# Patient Record
Sex: FEMALE | Employment: UNEMPLOYED | URBAN - METROPOLITAN AREA
[De-identification: names, ages, dates, MRNs, and addresses within clinical notes are randomized per-mention and may not be internally consistent; named-entity substitution may affect disease eponyms.]

---
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
## 2022-02-01 RX ORDER — NORGESTREL AND ETHINYL ESTRADIOL 0.3-0.03MG
KIT ORAL
Qty: 28 TABLET | Refills: 17 | OUTPATIENT
Start: 2022-02-01

## 2022-02-04 ENCOUNTER — OFFICE VISIT (OUTPATIENT)
Dept: PEDIATRIC ENDOCRINOLOGY | Age: 22
End: 2022-02-04
Payer: MEDICAID

## 2022-02-04 VITALS
HEART RATE: 90 BPM | WEIGHT: 159.13 LBS | DIASTOLIC BLOOD PRESSURE: 79 MMHG | BODY MASS INDEX: 27.17 KG/M2 | OXYGEN SATURATION: 96 % | RESPIRATION RATE: 18 BRPM | SYSTOLIC BLOOD PRESSURE: 129 MMHG | TEMPERATURE: 98 F

## 2022-02-04 DIAGNOSIS — E88.81 INSULIN RESISTANCE: Primary | ICD-10-CM

## 2022-02-04 PROCEDURE — 99214 OFFICE O/P EST MOD 30 MIN: CPT | Performed by: PEDIATRICS

## 2022-02-04 RX ORDER — NORGESTREL AND ETHINYL ESTRADIOL 0.3-0.03MG
1 KIT ORAL DAILY
Qty: 28 TABLET | Refills: 6 | Status: SHIPPED | OUTPATIENT
Start: 2022-02-04 | End: 2022-08-22

## 2022-02-04 RX ORDER — METFORMIN HYDROCHLORIDE 500 MG/1
500 TABLET, EXTENDED RELEASE ORAL
Qty: 30 TABLET | Refills: 6 | Status: SHIPPED | OUTPATIENT
Start: 2022-02-04

## 2022-02-04 NOTE — PROGRESS NOTES
[address] [facility]  [address], [address]  [phone]      Cc: PCOS        Insulin resistance        Acanthosis  [facility]: Tia Dougherty is a 23 years and 2 month old female who presents for follow up evaluation of PCOS. She is on metformin 500 mg SR 2 tabs twice a day, has not been taking the medications for few months as she had some stomach upset. She takes OCP with 30 mcg of estrogen once a day. Compliance with medications: good. Menstrual cycles: regular, facial hair: no, acne: better. Appetite: good, has 3 meals  2 snacks per day. Family history:  diabetes  Social history: school going: well     Review of Systems  Constitutional: good energy, GI:  normal bowel movements, no abdominal pain  Allergy: no skin rash or angioedema, Neurological: no headache, no focal weakness. Muscular: cramps:no, weakness/dizziness: no Skin: acne:better. She is going to college at [employer]. Past Medical History:       Past Medical History:   Diagnosis Date    History of repair of ACL 06/18/2019     Past Surgical History:   Procedure Laterality Date    HX ACL RECONSTRUCTION Left 06/18/2019     History reviewed. No pertinent family history.   Current Outpatient Medications   Medication Sig Dispense Refill    [place], 28, 0.3-30 mg-mcg tab TAKE 1 TABLET BY MOUTH EVERY DAY 28 Tab 17     No Known Allergies  Social History     Socioeconomic History    Marital status: SINGLE     Spouse name: Not on file    Number of children: Not on file    Years of education: Not on file    Highest education level: Not on file   Occupational History    Not on file   Tobacco Use    Smoking status: Never Smoker    Smokeless tobacco: Never Used   Substance and Sexual Activity    Alcohol use: No    Drug use: No    Sexual activity: Never   Other Topics Concern    Not on file   Social History Narrative    Not on file     Social Determinants of Health     Financial Resource Strain:     Difficulty of Paying Living Expenses: Not on file   Food Insecurity:     Worried About Running Out of Food in the Last Year: Not on file    Ran Out of Food in the Last Year: Not on file   Transportation Needs:     Lack of Transportation (Medical): Not on file    Lack of Transportation (Non-Medical): Not on file   Physical Activity:     Days of Exercise per Week: Not on file    Minutes of Exercise per Session: Not on file   Stress:     Feeling of Stress : Not on file   Social Connections:     Frequency of Communication with Friends and Family: Not on file    Frequency of Social Gatherings with Friends and Family: Not on file    Attends Quaker Services: Not on file    Active Member of [address] [place] [address] [employer] or Organizations: Not on file    Attends Club or Organization Meetings: Not on file    Marital Status: Not on file   Intimate Partner Violence:     Fear of Current or Ex-Partner: Not on file    Emotionally Abused: Not on file    Physically Abused: Not on file    Sexually Abused: Not on file   Housing Stability:     Unable to Pay for Housing in the Last Year: Not on file    Number of [place] in the Last Year: Not on file    Unstable Housing in the Last Year: Not on file     Objective:     Visit Vitals  /79 (BP 1 Location: Left arm, BP Patient Position: Sitting)   Pulse 90   Temp 98 °F (36.7 °C)   Resp 18   Wt 159 lb 2 oz (72.2 kg)   SpO2 96%   BMI 27.17 kg/m²     Wt Readings from Last 3 Encounters:   02/04/22 159 lb 2 oz (72.2 kg)   08/12/20 159 lb 6.4 oz (72.3 kg) (89 %, Z= 1.21)*   03/12/20 165 lb 6.4 oz (75 kg) (92 %, Z= 1.39)*     * Growth percentiles are based on CDC (Girls, 2-20 Years) data. Ht Readings from Last 3 Encounters:   08/12/20 5' 4.17\" (1.63 m) (49 %, Z= -0.03)*   03/12/20 5' 4.13\" (1.629 m) (48 %, Z= -0.04)*   12/12/19 5' 4.53\" (1.639 m) (55 %, Z= 0.12)*     * Growth percentiles are based on CDC (Girls, 2-20 Years) data. Body mass index is 27.17 kg/m².   Facility age limit for growth percentiles is 20 years. Facility age limit for growth percentiles is 20 years. Facility age limit for growth percentiles is 20 years. Physical Exam:   General appearance - hydration: normal, no respiratory distress EYE- conjuctiva: normal,  Mouth -palate: normal, dentition: normal  Neck - acanthosis: yes, significant, thyromegaly: no, pulse equal and normal rhythm  Abdomen - nondistended Skin- cafe au lait: no, acne: no, resolved, facial hair: no. Neuro -DTR: normal, muscle tone:normal    Assessment:Plan   PCOS  Weight: increased  Acanthosis:yes  Concern for lean PCOS reviewed  Fasting insulin is normal  Features of androgen excess: Acne: resolved,  Hirsutism: resolved  Time spent counseling patient 20 minutes on the following:  Medications: OCP and side effects and risk for clots was reviewed  Effect of weight management: reviewed, restart metformin 500 mg SR 1 tab po once a day. Labs: CMP, lipid profile and A1C  Total time with patient 30 minutes. Follow up in 6 months.

## 2022-02-05 LAB
ALBUMIN SERPL-MCNC: 4.7 G/DL (ref 3.9–5)
ALBUMIN/GLOB SERPL: 1.7 {RATIO} (ref 1.2–2.2)
ALP SERPL-CCNC: 57 IU/L (ref 42–106)
ALT SERPL-CCNC: 20 IU/L (ref 0–32)
AST SERPL-CCNC: 23 IU/L (ref 0–40)
BILIRUB SERPL-MCNC: 0.4 MG/DL (ref 0–1.2)
BUN SERPL-MCNC: 6 MG/DL (ref 6–20)
BUN/CREAT SERPL: 8 (ref 9–23)
CALCIUM SERPL-MCNC: 9.8 MG/DL (ref 8.7–10.2)
CHLORIDE SERPL-SCNC: 103 MMOL/L (ref 96–106)
CHOLEST SERPL-MCNC: 171 MG/DL (ref 100–199)
CO2 SERPL-SCNC: 21 MMOL/L (ref 20–29)
CREAT SERPL-MCNC: 0.77 MG/DL (ref 0.57–1)
EST. AVERAGE GLUCOSE BLD GHB EST-MCNC: 111 MG/DL
GLOBULIN SER CALC-MCNC: 2.7 G/DL (ref 1.5–4.5)
GLUCOSE SERPL-MCNC: 91 MG/DL (ref 65–99)
HBA1C MFR BLD: 5.5 % (ref 4.8–5.6)
HDLC SERPL-MCNC: 40 MG/DL
IMP & REVIEW OF LAB RESULTS: NORMAL
LDLC SERPL CALC-MCNC: 107 MG/DL (ref 0–99)
POTASSIUM SERPL-SCNC: 4.1 MMOL/L (ref 3.5–5.2)
PROT SERPL-MCNC: 7.4 G/DL (ref 6–8.5)
SODIUM SERPL-SCNC: 141 MMOL/L (ref 134–144)
TRIGL SERPL-MCNC: 135 MG/DL (ref 0–149)
VLDLC SERPL CALC-MCNC: 24 MG/DL (ref 5–40)

## 2022-08-04 ENCOUNTER — OFFICE VISIT (OUTPATIENT)
Dept: PEDIATRIC ENDOCRINOLOGY | Age: 22
End: 2022-08-04
Payer: MEDICAID

## 2022-08-04 VITALS
OXYGEN SATURATION: 97 % | HEIGHT: 65 IN | DIASTOLIC BLOOD PRESSURE: 62 MMHG | HEART RATE: 58 BPM | BODY MASS INDEX: 24.86 KG/M2 | RESPIRATION RATE: 16 BRPM | WEIGHT: 149.2 LBS | SYSTOLIC BLOOD PRESSURE: 118 MMHG | TEMPERATURE: 98.2 F

## 2022-08-04 DIAGNOSIS — E88.81 INSULIN RESISTANCE: Primary | ICD-10-CM

## 2022-08-04 PROCEDURE — 99214 OFFICE O/P EST MOD 30 MIN: CPT | Performed by: PEDIATRICS

## 2022-08-04 NOTE — PROGRESS NOTES
[address] [place], [place] [address]  [phone]      Cc: PCOS        Insulin resistance        Acanthosis  [facility]: Mitchell Perdue is a 23 years and 10 months old female who presents for follow up evaluation of PCOS. She is on metformin 500 mg SR 2 tabs twice a day, has not been taking the medications regularly. She takes OCP with 30 mcg of estrogen once a day. Compliance with medications: good. Menstrual cycles: regular, facial hair: no, acne: better. Appetite: good, has 3 meals  2 snacks per day. Family history:  diabetes  Social history: school going: well     Review of Systems  Constitutional: good energy, GI:  normal bowel movements, no abdominal pain  Allergy: no skin rash or angioedema, Neurological: no headache, no focal weakness. Muscular: cramps:no, weakness/dizziness: no Skin: acne:better. She is going to college at [employer]. Past Medical History:       Past Medical History:   Diagnosis Date    History of repair of ACL 06/18/2019     Past Surgical History:   Procedure Laterality Date    HX ACL RECONSTRUCTION Left 06/18/2019     History reviewed. No pertinent family history. Current Outpatient Medications   Medication Sig Dispense Refill    norgestrel-ethinyl estradioL ([place], 28,) 0.3-30 mg-mcg tab Take 1 Tablet by mouth daily. 28 Tablet 6    metFORMIN ER (GLUCOPHAGE XR) 500 mg tablet Take 1 Tablet by mouth daily (with dinner).  Indications: polycystic ovarian syndrome, a disease with cysts in the ovaries 30 Tablet 6     No Known Allergies  Social History     Socioeconomic History    Marital status: SINGLE     Spouse name: Not on file    Number of children: Not on file    Years of education: Not on file    Highest education level: Not on file   Occupational History    Not on file   Tobacco Use    Smoking status: Never    Smokeless tobacco: Never   Substance and Sexual Activity    Alcohol use: No    Drug use: No    Sexual activity: Never   Other Topics Concern    Not on file   Social History Narrative    Not on file     Social Determinants of Health     Financial Resource Strain: Not on file   Food Insecurity: Not on file   Transportation Needs: Not on file   Physical Activity: Not on file   Stress: Not on file   Social Connections: Not on file   Intimate Partner Violence: Not on file   Housing Stability: Not on file     Objective:     Visit Vitals  /62   Pulse (!) 58   Temp 98.2 °F (36.8 °C) (Oral)   Resp 16   Ht 5' 4.57\" (1.64 m)   Wt 149 lb 3.2 oz (67.7 kg)   LMP 07/25/2022   SpO2 97%   BMI 25.16 kg/m²     Wt Readings from Last 3 Encounters:   08/04/22 149 lb 3.2 oz (67.7 kg)   02/04/22 159 lb 2 oz (72.2 kg)   08/12/20 159 lb 6.4 oz (72.3 kg) (89 %, Z= 1.21)*     * Growth percentiles are based on [facility] (Girls, 2-20 Years) data. Ht Readings from Last 3 Encounters:   08/04/22 5' 4.57\" (1.64 m)   08/12/20 5' 4.17\" (1.63 m) (49 %, Z= -0.03)*   03/12/20 5' 4.13\" (1.629 m) (48 %, Z= -0.04)*     * Growth percentiles are based on [facility] (Girls, 2-20 Years) data. Body mass index is 25.16 kg/m². Facility age limit for growth percentiles is 20 years. Facility age limit for growth percentiles is 20 years. Facility age limit for growth percentiles is 20 years. Physical Exam:   General appearance - hydration: normal, no respiratory distress EYE- conjuctiva: normal,  Mouth -palate: normal, dentition: normal  Neck - acanthosis: yes, significant, thyromegaly: no, pulse equal and normal rhythm  Abdomen - nondistended Skin- cafe au lait: no, acne: no, resolved, facial hair: no. Neuro -DTR: normal, muscle tone:normal    Assessment:Plan   PCOS  Weight: done well with weight management and doing more physical activity.   Acanthosis:yes  Concern for lean PCOS reviewed  Fasting insulin is normal  Features of androgen excess: Acne: resolved,  Hirsutism: resolved  Time spent counseling patient 20 minutes on the following:  Medications: OCP and side effects and risk for clots was reviewed  Effect of weight management: reviewed, will stop metformin. Labs: CMP, lipid profile and A1C at next visit in 4 months  Total time with patient 30 minutes. Follow up in 4 months.

## 2022-08-22 RX ORDER — NORGESTREL AND ETHINYL ESTRADIOL 0.3-0.03MG
KIT ORAL
Qty: 28 TABLET | Refills: 6 | Status: SHIPPED | OUTPATIENT
Start: 2022-08-22

## 2022-12-12 ENCOUNTER — OFFICE VISIT (OUTPATIENT)
Dept: PEDIATRIC ENDOCRINOLOGY | Age: 22
End: 2022-12-12
Payer: MEDICAID

## 2022-12-12 VITALS
TEMPERATURE: 97.6 F | BODY MASS INDEX: 23.85 KG/M2 | WEIGHT: 148.4 LBS | RESPIRATION RATE: 18 BRPM | SYSTOLIC BLOOD PRESSURE: 120 MMHG | DIASTOLIC BLOOD PRESSURE: 82 MMHG | HEART RATE: 65 BPM | OXYGEN SATURATION: 98 % | HEIGHT: 66 IN

## 2022-12-12 DIAGNOSIS — E28.2 PCOS (POLYCYSTIC OVARIAN SYNDROME): Primary | ICD-10-CM

## 2022-12-12 PROCEDURE — 99214 OFFICE O/P EST MOD 30 MIN: CPT | Performed by: PEDIATRICS

## 2022-12-12 NOTE — LETTER
12/12/2022 12:11 PM    Patient:  Fiorella Barahona   YOB: 2000  Date of Visit: 12/12/2022      Dear MD [phone]  Via Fax: [phone]:      Thank you for referring Ms. Fiorella Barahona to me for evaluation/treatment. Below are the relevant portions of my assessment and plan of care. Chief Complaint   Patient presents with    PCOS     4 month f/u       1. Have you been to the ER, urgent care clinic since your last visit? Hospitalized since your last visit? No    2. Have you seen or consulted any other health care providers outside of the [facility] [place] [address] since your last visit? Include any pap smears or colon screening. No    Visit Vitals  /82 (BP 1 Location: Left upper arm, BP Patient Position: Sitting)   Pulse 65   Temp 97.6 °F (36.4 °C) (Oral)   Resp 18   Ht 5' 5.51\" (1.664 m)   Wt 148 lb 6.4 oz (67.3 kg)   SpO2 98%   BMI 24.31 kg/m²         [address] [address] [phone]      Cc: PCOS        Insulin resistance        Acanthosis        Stress-     [facility]: Fiorella Barahona is a 23 years and 8 months old female who presents for follow up evaluation of PCOS. She was on metformin 500 mg SR 2 tabs twice a day, has been stopped. She takes OCP with 30 mcg of estrogen once a day. Compliance with medications: good. Menstrual cycles: not regular for last 3-4 months, less bleeding and last 3-5 days, has more stress related to home and related to work, facial hair: no, acne: better. Appetite: good, has 3 meals  2 snacks per day. Family history:  diabetes  Social history: school going: well     Review of Systems  Constitutional: good energy, GI:  normal bowel movements, no abdominal pain  Allergy: no skin rash or angioedema, Neurological: no headache, no focal weakness. Muscular: cramps:no, weakness/dizziness: no Skin: acne:better.  She is going to college at [employer]. Past Medical History:       Past Medical History:   Diagnosis Date    History of repair of ACL 06/18/2019     Past Surgical History:   Procedure Laterality Date    HX ACL RECONSTRUCTION Left 06/18/2019     History reviewed. No pertinent family history. Current Outpatient Medications   Medication Sig Dispense Refill    Low-Ogestrel, 28, 0.3-30 mg-mcg tab TAKE 1 TABLET BY MOUTH EVERY DAY 28 Tablet 6    metFORMIN ER (GLUCOPHAGE XR) 500 mg tablet Take 1 Tablet by mouth daily (with dinner).  Indications: polycystic ovarian syndrome, a disease with cysts in the ovaries (Patient not taking: Reported on 12/12/2022) 30 Tablet 6     No Known Allergies  Social History     Socioeconomic History    Marital status: SINGLE     Spouse name: Not on file    Number of children: Not on file    Years of education: Not on file    Highest education level: Not on file   Occupational History    Not on file   Tobacco Use    Smoking status: Never    Smokeless tobacco: Never   Substance and Sexual Activity    Alcohol use: No    Drug use: No    Sexual activity: Never   Other Topics Concern    Not on file   Social History Narrative    Not on file     Social Determinants of Health     Financial Resource Strain: Not on file   Food Insecurity: Not on file   Transportation Needs: Not on file   Physical Activity: Not on file   Stress: Not on file   Social Connections: Not on file   Intimate Partner Violence: Not on file   Housing Stability: Not on file     Objective:     Visit Vitals  /82 (BP 1 Location: Left upper arm, BP Patient Position: Sitting)   Pulse 65   Temp 97.6 °F (36.4 °C) (Oral)   Resp 18   Ht 5' 5.51\" (1.664 m)   Wt 148 lb 6.4 oz (67.3 kg)   SpO2 98%   BMI 24.31 kg/m²     Wt Readings from Last 3 Encounters:   12/12/22 148 lb 6.4 oz (67.3 kg)   08/04/22 149 lb 3.2 oz (67.7 kg)   02/04/22 159 lb 2 oz (72.2 kg)     Ht Readings from Last 3 Encounters:   12/12/22 5' 5.51\" (1.664 m)   08/04/22 5' 4.57\" (1.64 m)   08/12/20 5' 4.17\" (1.63 m) (49 %, Z= -0.03)*     * Growth percentiles are based on [facility] (Girls, 2-20 Years) data. Body mass index is 24.31 kg/m². Facility age limit for growth percentiles is 20 years. Facility age limit for growth percentiles is 20 years. Facility age limit for growth percentiles is 20 years. Physical Exam:   General appearance - hydration: normal, no respiratory distress EYE- conjuctiva: normal,  Mouth -palate: normal, dentition: normal  Neck - acanthosis: yes, significant, thyromegaly: no, pulse equal and normal rhythm  Abdomen - nondistended Skin- cafe au lait: no, acne: no, resolved, facial hair: no. Neuro -DTR: normal, muscle tone:normal    Assessment:Plan   PCOS  Weight: done well with weight management and doing more physical activity. Acanthosis:yes  Concern for lean PCOS reviewed  Fasting insulin is normal  Cycles are regular on the OCP, likely related to stress and met our counselor today. Features of androgen excess: Acne: resolved,  Hirsutism: resolved  Time spent counseling patient 20 minutes on the following:  Medications: OCP and side effects and risk for clots was reviewed  Effect of weight management: reviewed, metformin was stopped. Stress-talked to our counselor today and got information. Follow up in 5 months. ABDI met with Sánchez Jim to conduct mood/emotional check in and provide therapeutic support. Sánchez Jim presented as stressed and overwhelmed due to struggles with work/life/school balance. She is a full-time student and also works. She is not currently in therapy, but was in the past through her college. ABDI discussed the benefits of therapy to better mental health and well- being. Tia denied concerns with safety. She reported being open to therapy, but unsure of what it would look like for her at this point. She was provided with a list of healthy/positive to assist with mood management.   Sánchez Jim was encouraged to take some time to think about restarting counseling with new provider or pervious provider. SW discussed plan for Alfonso Polanco to follow up at her earliest convenience. Will continue to follow in-clinic as needed and permitted. If you have questions, please do not hesitate to call me. I look forward to following Ms. Mumtaz Regalado along with you.         Sincerely,      Yolis Hutchinson MD

## 2022-12-12 NOTE — PROGRESS NOTES
Chief Complaint   Patient presents with    PCOS     4 month f/u       1. Have you been to the ER, urgent care clinic since your last visit? Hospitalized since your last visit? No    2. Have you seen or consulted any other health care providers outside of the [address] [place] [address] since your last visit? Include any pap smears or colon screening.  No    Visit Vitals  /82 (BP 1 Location: Left upper arm, BP Patient Position: Sitting)   Pulse 65   Temp 97.6 °F (36.4 °C) (Oral)   Resp 18   Ht 5' 5.51\" (1.664 m)   Wt 148 lb 6.4 oz (67.3 kg)   SpO2 98%   BMI 24.31 kg/m²

## 2022-12-12 NOTE — PROGRESS NOTES
[facility]  [address] [place], [place] [address]  [phone]      Cc: PCOS        Insulin resistance        Acanthosis        Stress-     \[facility] [place] [facility]\"": Ryland Yung is a 23 years and 8 months old female who presents for follow up evaluation of PCOS. She was on metformin 500 mg SR 2 tabs twice a day, has been stopped. She takes OCP with 30 mcg of estrogen once a day. Compliance with medications: good. Menstrual cycles: not regular for last 3-4 months, less bleeding and last 3-5 days, has more stress related to home and related to work, facial hair: no, acne: better. Appetite: good, has 3 meals  2 snacks per day. Family history:  diabetes  Social history: school going: well     Review of Systems  Constitutional: good energy, GI:  normal bowel movements, no abdominal pain  Allergy: no skin rash or angioedema, Neurological: no headache, no focal weakness. Muscular: cramps:no, weakness/dizziness: no Skin: acne:better. She is going to college at [employer]. Past Medical History:       Past Medical History:   Diagnosis Date    History of repair of ACL 06/18/2019     Past Surgical History:   Procedure Laterality Date    HX ACL RECONSTRUCTION Left 06/18/2019     History reviewed. No pertinent family history. Current Outpatient Medications   Medication Sig Dispense Refill    Low-Ogestrel, 28, 0.3-30 mg-mcg tab TAKE 1 TABLET BY MOUTH EVERY DAY 28 Tablet 6    metFORMIN ER (GLUCOPHAGE XR) 500 mg tablet Take 1 Tablet by mouth daily (with dinner).  Indications: polycystic ovarian syndrome, a disease with cysts in the ovaries (Patient not taking: Reported on 12/12/2022) 30 Tablet 6     No Known Allergies  Social History     Socioeconomic History    Marital status: SINGLE     Spouse name: Not on file    Number of children: Not on file    Years of education: Not on file    Highest education level: Not on file   Occupational History    Not on file   Tobacco Use    Smoking status: Never    Smokeless tobacco: Never   Substance and Sexual Activity    Alcohol use: No    Drug use: No    Sexual activity: Never   Other Topics Concern    Not on file   Social History Narrative    Not on file     Social Determinants of Health     Financial Resource Strain: Not on file   Food Insecurity: Not on file   Transportation Needs: Not on file   Physical Activity: Not on file   Stress: Not on file   Social Connections: Not on file   Intimate Partner Violence: Not on file   Housing Stability: Not on file     Objective:     Visit Vitals  /82 (BP 1 Location: Left upper arm, BP Patient Position: Sitting)   Pulse 65   Temp 97.6 °F (36.4 °C) (Oral)   Resp 18   Ht 5' 5.51\" (1.664 m)   Wt 148 lb 6.4 oz (67.3 kg)   SpO2 98%   BMI 24.31 kg/m²     Wt Readings from Last 3 Encounters:   12/12/22 148 lb 6.4 oz (67.3 kg)   08/04/22 149 lb 3.2 oz (67.7 kg)   02/04/22 159 lb 2 oz (72.2 kg)     Ht Readings from Last 3 Encounters:   12/12/22 5' 5.51\" (1.664 m)   08/04/22 5' 4.57\" (1.64 m)   08/12/20 5' 4.17\" (1.63 m) (49 %, Z= -0.03)*     * Growth percentiles are based on CDC (Girls, 2-20 Years) data. Body mass index is 24.31 kg/m². Facility age limit for growth percentiles is 20 years. Facility age limit for growth percentiles is 20 years. Facility age limit for growth percentiles is 20 years. Physical Exam:   General appearance - hydration: normal, no respiratory distress EYE- conjuctiva: normal,  Mouth -palate: normal, dentition: normal  Neck - acanthosis: yes, significant, thyromegaly: no, pulse equal and normal rhythm  Abdomen - nondistended Skin- cafe au lait: no, acne: no, resolved, facial hair: no. Neuro -DTR: normal, muscle tone:normal    Assessment:Plan   PCOS  Weight: done well with weight management and doing more physical activity. Acanthosis:yes  Concern for lean PCOS reviewed  Fasting insulin is normal  Cycles are regular on the OCP, likely related to stress and met our counselor today.   Features of androgen excess: Acne: resolved,  Hirsutism: resolved  Time spent counseling patient 20 minutes on the following:  Medications: OCP and side effects and risk for clots was reviewed  Effect of weight management: reviewed, metformin was stopped. Stress-talked to our counselor today and got information. Follow up in 5 months.

## 2022-12-12 NOTE — PROGRESS NOTES
ABDI met with Chong Primrose to conduct mood/emotional check in and provide therapeutic support. Chong Primrose presented as stressed and overwhelmed due to struggles with work/life/school balance. She is a full-time student and also works. She is not currently in therapy, but was in the past through her college. ABDI discussed the benefits of therapy to better mental health and well- being. Tia denied concerns with safety. She reported being open to therapy, but unsure of what it would look like for her at this point. She was provided with a list of healthy/positive to assist with mood management. Chong Primrose was encouraged to take some time to think about restarting counseling with new provider or pervious provider. ABDI discussed plan for Chong Primrose to follow up at her earliest convenience. Will continue to follow in-clinic as needed and permitted.

## 2023-03-08 RX ORDER — NORGESTREL AND ETHINYL ESTRADIOL 0.3-0.03MG
KIT ORAL
Qty: 28 TABLET | Refills: 6 | Status: SHIPPED | OUTPATIENT
Start: 2023-03-08

## 2023-03-25 NOTE — PROGRESS NOTES
Jacquelyn Bolanos is a 26 y.o. female returns for an annual exam     Chief Complaint   Patient presents with    Well Woman       Patient's last menstrual period was 03/08/2023. Her periods are lighter and shorter. She reports some months she will not have any bleeding. Problems: no significant problems  Birth Control: OCP (estrogen/progesterone). Last Pap: pap performed today  She does not have a history of ANURADHA 2, 3 or cervical cancer. With regard to the Gardisil vaccine, she has received all 3 injections. 1. Have you been to the ER, urgent care clinic, or hospitalized since your last visit? N/a-new pt    2. Have you seen or consulted any other health care providers outside of the [address] [place] [address] since your last visit? N/a-new pt    Examination chaperoned by Raquel Ware CMA.

## 2023-03-27 ENCOUNTER — OFFICE VISIT (OUTPATIENT)
Dept: OBGYN CLINIC | Age: 23
End: 2023-03-27
Payer: MEDICAID

## 2023-03-27 VITALS — DIASTOLIC BLOOD PRESSURE: 81 MMHG | BODY MASS INDEX: 24.41 KG/M2 | SYSTOLIC BLOOD PRESSURE: 119 MMHG | WEIGHT: 149 LBS

## 2023-03-27 DIAGNOSIS — Z11.3 SCREENING EXAMINATION FOR SEXUALLY TRANSMITTED DISEASE: ICD-10-CM

## 2023-03-27 DIAGNOSIS — Z01.419 WELL FEMALE EXAM WITH ROUTINE GYNECOLOGICAL EXAM: Primary | ICD-10-CM

## 2023-03-27 PROCEDURE — 99385 PREV VISIT NEW AGE 18-39: CPT | Performed by: OBSTETRICS & GYNECOLOGY

## 2023-03-27 RX ORDER — DROSPIRENONE AND ETHINYL ESTRADIOL 0.02-3(28)
1 KIT ORAL DAILY
Qty: 90 TABLET | Refills: 4 | Status: SHIPPED | OUTPATIENT
Start: 2023-03-27

## 2023-03-27 NOTE — PROGRESS NOTES
Annual exam  Tod Men is a No obstetric history on file. ,  26 y.o. female   Patient's last menstrual period was 03/08/2023. She presents for her annual checkup. She is having irregular current ocps. Menstrual status:    Has skipped periods and then had light bleeding on 2 ocassions with next pack of OCPS. Diagnosed with PCOS by pediatrician     Sexual history:    She  reports being sexually active and has had partner(s) who are male. She reports using the following method of birth control/protection: Pill. Per Nursing Note:  Patient's last menstrual period was 03/08/2023. Her periods are lighter and shorter. She reports some months she will not have any bleeding. Problems: no significant problems  Birth Control: OCP (estrogen/progesterone). Last Pap: pap performed today  She does not have a history of ANURADHA 2, 3 or cervical cancer. With regard to the Gardisil vaccine, she has received all 3 injections. Past Medical History:   Diagnosis Date    History of repair of ACL 06/18/2019    PCOS (polycystic ovarian syndrome)      Past Surgical History:   Procedure Laterality Date    HX ACL RECONSTRUCTION Left 06/18/2019       Current Outpatient Medications   Medication Sig Dispense Refill    drospirenone-ethinyl estradioL (Mis, 28,) 3-0.02 mg tab Take 1 Tablet by mouth daily. 90 Tablet 4     Allergies: Patient has no known allergies. Tobacco History:  reports that she has never smoked. She has never used smokeless tobacco.  Alcohol Abuse:  reports no history of alcohol use. Drug Abuse:  reports no history of drug use. Family Medical/Cancer History: History reviewed. No pertinent family history.      Review of Systems - History obtained from the patient  Constitutional: negative for weight loss, fever, night sweats  HEENT: negative for hearing loss, earache, congestion, snoring, sorethroat  CV: negative for chest pain, palpitations, edema  Resp: negative for cough, shortness of breath, wheezing  GI: negative for change in bowel habits, abdominal pain, black or bloody stools  : negative for frequency, dysuria, hematuria, vaginal discharge  MSK: negative for back pain, joint pain, muscle pain  Breast: negative for breast lumps, nipple discharge, galactorrhea  Skin :negative for itching, rash, hives  Neuro: negative for dizziness, headache, confusion, weakness  Psych: negative for anxiety, depression, change in mood  Heme/lymph: negative for bleeding, bruising, pallor    Physical Exam    Visit Vitals  /81   Wt 149 lb (67.6 kg)   LMP 03/08/2023   BMI 24.41 kg/m²       Constitutional  Appearance: well-nourished, well developed, alert, in no acute distress    HENT  Head and Face: appears normal    Neck  Inspection/Palpation: normal appearance, no masses or tenderness  Lymph Nodes: no lymphadenopathy present  Thyroid: gland size normal, nontender, no nodules or masses present on palpation    Chest  Respiratory Effort: breathing unlabored    Breasts  Inspection of Breasts: breasts symmetrical, no skin changes, no discharge present, nipple appearance normal, no skin retraction present  Palpation of Breasts and Axillae: no masses present on palpation, no breast tenderness  Axillary Lymph Nodes: no lymphadenopathy present    Gastrointestinal  Abdominal Examination: abdomen non-tender to palpation, normal bowel sounds, no masses present  Liver and spleen: no hepatomegaly present, spleen not palpable  Hernias: no hernias identified    Genitourinary  External Genitalia: normal appearance for age, no discharge present, no tenderness present, no inflammatory lesions present, no masses present, no atrophy present  Vagina: normal vaginal vault without central or paravaginal defects, no discharge present, no inflammatory lesions present, no masses present  Bladder: non-tender to palpation  Urethra: appears normal  Cervix: normal   Uterus: normal size, shape and consistency  Adnexa: no adnexal tenderness present, no adnexal masses present  Perineum: perineum within normal limits, no evidence of trauma, no rashes or skin lesions present  Anus: anus within normal limits, no hemorrhoids present  Inguinal Lymph Nodes: no lymphadenopathy present    Skin  General Inspection: no rash, no lesions identified    Neurologic/Psychiatric  Mental Status:  Orientation: grossly oriented to person, place and time  Mood and Affect: mood normal, affect appropriate    Assessment:  Routine gynecologic examination  Her current medical status is satisfactory with no evidence of significant gynecologic issues. PCOS- continue ocps, will switch to different pill due to AUB on current pill. May stop soon to try for pregnancy.       Plan:  Counseled re: diet, exercise, healthy lifestyle  Return for yearly wellness visits

## 2023-03-28 LAB
HBV SURFACE AG SERPL QL IA: NEGATIVE
HCV IGG SERPL QL IA: NON REACTIVE
HIV 1+2 AB+HIV1 P24 AG SERPL QL IA: NON REACTIVE
RPR SER QL: NON REACTIVE

## 2023-03-31 LAB
C TRACH RRNA CVX QL NAA+PROBE: NEGATIVE
CYTOLOGIST CVX/VAG CYTO: NORMAL
CYTOLOGY CVX/VAG DOC CYTO: NORMAL
CYTOLOGY CVX/VAG DOC THIN PREP: NORMAL
DX ICD CODE: NORMAL
LABCORP, 190119: NORMAL
Lab: NORMAL
N GONORRHOEA RRNA CVX QL NAA+PROBE: NEGATIVE
OTHER STN SPEC: NORMAL
STAT OF ADQ CVX/VAG CYTO-IMP: NORMAL

## 2023-05-22 RX ORDER — DROSPIRENONE AND ETHINYL ESTRADIOL 0.02-3(28)
1 KIT ORAL DAILY
COMMUNITY
Start: 2023-03-27

## 2024-03-15 ENCOUNTER — OFFICE VISIT (OUTPATIENT)
Facility: CLINIC | Age: 24
End: 2024-03-15
Payer: COMMERCIAL

## 2024-03-15 VITALS
DIASTOLIC BLOOD PRESSURE: 69 MMHG | OXYGEN SATURATION: 98 % | HEART RATE: 66 BPM | WEIGHT: 150.4 LBS | TEMPERATURE: 99.1 F | HEIGHT: 64 IN | RESPIRATION RATE: 16 BRPM | BODY MASS INDEX: 25.68 KG/M2 | SYSTOLIC BLOOD PRESSURE: 109 MMHG

## 2024-03-15 DIAGNOSIS — Z00.00 ADULT GENERAL MEDICAL EXAM: Primary | ICD-10-CM

## 2024-03-15 DIAGNOSIS — K13.0 ANGULAR CHEILITIS: ICD-10-CM

## 2024-03-15 DIAGNOSIS — E28.2 PCOS (POLYCYSTIC OVARIAN SYNDROME): ICD-10-CM

## 2024-03-15 PROBLEM — M42.00: Status: ACTIVE | Noted: 2019-04-05

## 2024-03-15 PROBLEM — S83.519A RUPTURE OF ANTERIOR CRUCIATE LIGAMENT: Status: ACTIVE | Noted: 2019-03-25

## 2024-03-15 PROBLEM — H10.89 PAPILLARY CONJUNCTIVITIS: Status: ACTIVE | Noted: 2020-07-14

## 2024-03-15 PROBLEM — H01.009 BLEPHARITIS: Status: ACTIVE | Noted: 2020-07-14

## 2024-03-15 PROBLEM — H00.19 CHALAZION: Status: ACTIVE | Noted: 2020-07-14

## 2024-03-15 PROBLEM — H52.203 ASTIGMATISM OF BOTH EYES: Status: ACTIVE | Noted: 2019-06-14

## 2024-03-15 PROBLEM — H52.13 MYOPIA OF BOTH EYES: Status: ACTIVE | Noted: 2019-06-14

## 2024-03-15 PROBLEM — H53.9 ABNORMAL VISION: Status: ACTIVE | Noted: 2020-08-14

## 2024-03-15 PROCEDURE — 99385 PREV VISIT NEW AGE 18-39: CPT | Performed by: FAMILY MEDICINE

## 2024-03-15 RX ORDER — BIOTIN 10000 MCG
10 CAPSULE ORAL DAILY
COMMUNITY

## 2024-03-15 RX ORDER — MAGNESIUM 30 MG
30 TABLET ORAL 2 TIMES DAILY
COMMUNITY
End: 2024-03-15

## 2024-03-15 SDOH — ECONOMIC STABILITY: FOOD INSECURITY: WITHIN THE PAST 12 MONTHS, THE FOOD YOU BOUGHT JUST DIDN'T LAST AND YOU DIDN'T HAVE MONEY TO GET MORE.: NEVER TRUE

## 2024-03-15 SDOH — ECONOMIC STABILITY: FOOD INSECURITY: WITHIN THE PAST 12 MONTHS, YOU WORRIED THAT YOUR FOOD WOULD RUN OUT BEFORE YOU GOT MONEY TO BUY MORE.: NEVER TRUE

## 2024-03-15 SDOH — ECONOMIC STABILITY: HOUSING INSECURITY
IN THE LAST 12 MONTHS, WAS THERE A TIME WHEN YOU DID NOT HAVE A STEADY PLACE TO SLEEP OR SLEPT IN A SHELTER (INCLUDING NOW)?: NO

## 2024-03-15 SDOH — ECONOMIC STABILITY: INCOME INSECURITY: HOW HARD IS IT FOR YOU TO PAY FOR THE VERY BASICS LIKE FOOD, HOUSING, MEDICAL CARE, AND HEATING?: NOT HARD AT ALL

## 2024-03-15 ASSESSMENT — PATIENT HEALTH QUESTIONNAIRE - PHQ9
SUM OF ALL RESPONSES TO PHQ QUESTIONS 1-9: 0
2. FEELING DOWN, DEPRESSED OR HOPELESS: 0
SUM OF ALL RESPONSES TO PHQ QUESTIONS 1-9: 0
1. LITTLE INTEREST OR PLEASURE IN DOING THINGS: 0
SUM OF ALL RESPONSES TO PHQ QUESTIONS 1-9: 0
SUM OF ALL RESPONSES TO PHQ9 QUESTIONS 1 & 2: 0
SUM OF ALL RESPONSES TO PHQ QUESTIONS 1-9: 0

## 2024-03-15 ASSESSMENT — ANXIETY QUESTIONNAIRES
4. TROUBLE RELAXING: 0
IF YOU CHECKED OFF ANY PROBLEMS ON THIS QUESTIONNAIRE, HOW DIFFICULT HAVE THESE PROBLEMS MADE IT FOR YOU TO DO YOUR WORK, TAKE CARE OF THINGS AT HOME, OR GET ALONG WITH OTHER PEOPLE: NOT DIFFICULT AT ALL
7. FEELING AFRAID AS IF SOMETHING AWFUL MIGHT HAPPEN: 0
1. FEELING NERVOUS, ANXIOUS, OR ON EDGE: 0
GAD7 TOTAL SCORE: 0
5. BEING SO RESTLESS THAT IT IS HARD TO SIT STILL: 0
2. NOT BEING ABLE TO STOP OR CONTROL WORRYING: 0
3. WORRYING TOO MUCH ABOUT DIFFERENT THINGS: 0
6. BECOMING EASILY ANNOYED OR IRRITABLE: 0

## 2024-03-15 NOTE — PROGRESS NOTES
Chief Complaint   Patient presents with    New Patient     Establishing care     \"Have you been to the ER, urgent care clinic since your last visit?  Hospitalized since your last visit?\"    NO    “Have you seen or consulted any other health care providers outside of [facility] since your last visit?”    NO            Click Here for Release of Records Request

## 2024-03-15 NOTE — PROGRESS NOTES
Chief Complaint   Patient presents with    New Patient     Establishing care     she is a 24 y.o. year old female who presents for CPE  Complete Physical Exam Questions:    LMP -  irregular  Last Pap (q 3 years, or q5 with HPV) - 2023  Last Mammogram (50-74 biennially)- n/a  Hx of abnl Pap - No    1.  Do you follow a low fat diet?  yes  2.  Are you up to date on your Tdap (<10 years)?  Unknown  3.  Have you ever had a Pneumovax vaccine (>65)?  Not applicable   PCV13 Not applicable   PPSV23 Not applicable  4.  Have you had Zoster vaccine (>60)? Not applicable  5.  Have you had the HPV - Gardasil (13- 26)? Yes  6.  Do you follow an exercise program?  Yes  7.  Do you smoke?  No  8.  Do you consider yourself overweight?  No  9.  Is there a family history of CAD< age 50? No  10.  Is there a family history of Cancer?  No  11.  Do you know your Cancer risks?  Yes  12.  Have you had a colonoscopy?  Not applicable  13. Have you been tested for HIV or other STI's? Yes HIV testing today(18-64 y/o)?No  14.  Have had a bone density scan or DEXA done(>65)?Not applicable  15.  Have you had an EKG performed in the last five years (>50)?  Not applicable    Other complaints: Hx of PCOS, feels OCPs are causing some irregular periods and mood issues; previously not well controlled on metformin or other OCPs.    Chelitis - over past several months, significant issues with angular chelitis, not responding to steroid creams, chapstick, lotions.  Denies significant change in diet, but did stop drinking milk as felt it was too unhealthy with her lattes, etc.      Reviewed and agree with Nurse Note and duplicated in this note.  Reviewed PmHx, RxHx, FmHx, SocHx, AllgHx and updated and dated in the chart.    Family History   Problem Relation Age of Onset    Heart Murmur Mother        Past Medical History:   Diagnosis Date    History of repair of ACL 06/18/2019    PCOS (polycystic ovarian syndrome)       Social History     Socioeconomic History

## 2024-04-03 RX ORDER — DROSPIRENONE AND ETHINYL ESTRADIOL 0.02-3(28)
1 KIT ORAL DAILY
Qty: 84 TABLET | Refills: 4 | OUTPATIENT
Start: 2024-04-03

## 2024-04-18 NOTE — PROGRESS NOTES
Diana Varghese is a 24 y.o. female returns for an annual exam     Chief Complaint   Patient presents with    Routine Prenatal Visit       No LMP recorded.  Her periods are light in flow and usually regular with a 26-32 day interval with 3-7 day duration.  She does not have dysmenorrhea.  Problems: no problems  Birth Control: OCP (estrogen/progesterone).  Last Pap: normal obtained 1 year(s) ago.  She does not have a history of KIESHA 2, 3 or cervical cancer.   With regard to the Gardisil vaccine, she has received all 3 injections  She declines STD testing.      1. Have you been to the ER, urgent care clinic, or hospitalized since your last visit? No    2. Have you seen or consulted any other health care providers outside of the [facility] since your last visit? No    Examination chaperoned by Federico Chambers CMA.

## 2024-04-19 ENCOUNTER — OFFICE VISIT (OUTPATIENT)
Age: 24
End: 2024-04-19
Payer: COMMERCIAL

## 2024-04-19 VITALS — WEIGHT: 153 LBS | BODY MASS INDEX: 26.26 KG/M2 | DIASTOLIC BLOOD PRESSURE: 86 MMHG | SYSTOLIC BLOOD PRESSURE: 132 MMHG

## 2024-04-19 DIAGNOSIS — Z11.3 ENCOUNTER FOR SCREENING FOR INFECTIONS WITH A PREDOMINANTLY SEXUAL MODE OF TRANSMISSION: ICD-10-CM

## 2024-04-19 DIAGNOSIS — Z01.419 ENCOUNTER FOR GYNECOLOGICAL EXAMINATION (GENERAL) (ROUTINE) WITHOUT ABNORMAL FINDINGS: Primary | ICD-10-CM

## 2024-04-19 PROCEDURE — 99395 PREV VISIT EST AGE 18-39: CPT | Performed by: OBSTETRICS & GYNECOLOGY

## 2024-04-19 RX ORDER — DROSPIRENONE AND ETHINYL ESTRADIOL 0.02-3(28)
1 KIT ORAL DAILY
Qty: 3 PACKET | Refills: 4 | Status: SHIPPED | OUTPATIENT
Start: 2024-04-19

## 2024-04-19 NOTE — PROGRESS NOTES
Annual exam  Diana Varghese is a No obstetric history on file.,  24 y.o. female   No LMP recorded.    She presents for her annual checkup.     She has no significant complaints     Sexual history:    She  reports being sexually active and has had partner(s) who are male. She reports using the following method of birth control/protection: Pill.    Per Nursing Note:  No LMP recorded.  Her periods are light in flow and usually regular with a 26-32 day interval with 3-7 day duration.  She does not have dysmenorrhea.  Problems: no problems  Birth Control: OCP (estrogen/progesterone).  Last Pap: normal obtained 1 year(s) ago.  She does not have a history of KIESHA 2, 3 or cervical cancer.   With regard to the Gardisil vaccine, she has received all 3 injections  She declines STD testing.    Past Medical History:   Diagnosis Date    History of repair of ACL 06/18/2019    PCOS (polycystic ovarian syndrome)      Past Surgical History:   Procedure Laterality Date    ANTERIOR CRUCIATE LIGAMENT REPAIR Left 06/18/2019       Current Outpatient Medications   Medication Sig Dispense Refill    Biotin 10 MG CAPS Take 10 mg by mouth daily      drospirenone-ethinyl estradiol (LUZ MARINA) 3-0.02 MG per tablet Take 1 tablet by mouth daily       No current facility-administered medications for this visit.     Allergies: Patient has no known allergies.     Tobacco History:  reports that she has never smoked. She has never been exposed to tobacco smoke. She has never used smokeless tobacco.  Alcohol Abuse:  reports no history of alcohol use.  Drug Abuse:  reports no history of drug use.    Family Medical/Cancer History:   Family History   Problem Relation Age of Onset    Heart Murmur Mother         Review of Systems - History obtained from the patient  Constitutional: negative for weight loss, fever, night sweats  HEENT: negative for hearing loss, earache, congestion, snoring, sorethroat  CV: negative for chest pain, palpitations, edema  Resp:

## 2024-05-16 ENCOUNTER — OFFICE VISIT (OUTPATIENT)
Facility: CLINIC | Age: 24
End: 2024-05-16
Payer: COMMERCIAL

## 2024-05-16 VITALS
RESPIRATION RATE: 18 BRPM | WEIGHT: 156.8 LBS | HEART RATE: 82 BPM | DIASTOLIC BLOOD PRESSURE: 65 MMHG | BODY MASS INDEX: 26.77 KG/M2 | OXYGEN SATURATION: 97 % | HEIGHT: 64 IN | SYSTOLIC BLOOD PRESSURE: 99 MMHG | TEMPERATURE: 97.5 F

## 2024-05-16 DIAGNOSIS — Z11.1 TUBERCULOSIS SCREENING: Primary | ICD-10-CM

## 2024-05-16 DIAGNOSIS — Z23 NEED FOR VACCINATION: ICD-10-CM

## 2024-05-16 PROBLEM — Z98.890 S/P RECONSTRUCTION OF ACL OF RIGHT KNEE USING BONE-PATELLAR TENDON-BONE AUTOGRAFT: Status: ACTIVE | Noted: 2024-05-16

## 2024-05-16 PROCEDURE — 90715 TDAP VACCINE 7 YRS/> IM: CPT | Performed by: FAMILY MEDICINE

## 2024-05-16 PROCEDURE — 99213 OFFICE O/P EST LOW 20 MIN: CPT | Performed by: FAMILY MEDICINE

## 2024-05-16 PROCEDURE — 90471 IMMUNIZATION ADMIN: CPT | Performed by: FAMILY MEDICINE

## 2024-05-16 RX ORDER — ASPIRIN 81 MG/1
81 TABLET, CHEWABLE ORAL DAILY
COMMUNITY

## 2024-05-16 NOTE — PROGRESS NOTES
Diana Varghese is a 24 y.o. female who complains of   Chief Complaint   Patient presents with    Other     Patient states that she is here to get her forms completed.   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  Needs paperwork reporting last physical was normal, as well as updated vaccines.  Vaccines reviewed in office, tetanus was last given in 2013 and will need to be updated today.  QuantiFERON testing needed as well.  Otherwise patient is up-to-date.    Reviewed and agree with Nurse Note and duplicated in this note.  Reviewed PmHx, RxHx, FmHx, SocHx, AllgHx and updated and dated in the chart.    No Known Allergies  Family History   Problem Relation Age of Onset    Heart Murmur Mother        Past Medical History:   Diagnosis Date    History of repair of ACL 06/18/2019    PCOS (polycystic ovarian syndrome)     Torn ACL       Social History     Socioeconomic History    Marital status: Single     Spouse name: None    Number of children: None    Years of education: None    Highest education level: None   Tobacco Use    Smoking status: Never     Passive exposure: Never    Smokeless tobacco: Never   Vaping Use    Vaping Use: Never used   Substance and Sexual Activity    Alcohol use: No    Drug use: No    Sexual activity: Yes     Partners: Male     Birth control/protection: Pill   Social History Narrative    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

## 2024-05-16 NOTE — PROGRESS NOTES
Chief Complaint   Patient presents with    Other     Patient states that she is here to get her forms completed.     \"Have you been to the ER, urgent care clinic since your last visit?  Hospitalized since your last visit?\"    NO    “Have you seen or consulted any other health care providers outside of [facility] since your last visit?”    NO            Click Here for Release of Records Request

## 2024-05-20 LAB
M TB IFN-G BLD-IMP: NEGATIVE
M TB IFN-G CD4+ T-CELLS BLD-ACNC: 0.11 IU/ML
M TBIFN-G CD4+ CD8+T-CELLS BLD-ACNC: 0.09 IU/ML
QUANTIFERON CRITERIA: NORMAL
QUANTIFERON MITOGEN VALUE: >10 IU/ML
QUANTIFERON NIL VALUE: 0.06 IU/ML

## 2024-07-15 RX ORDER — DROSPIRENONE AND ETHINYL ESTRADIOL 0.02-3(28)
1 KIT ORAL DAILY
Qty: 84 TABLET | Refills: 1 | OUTPATIENT
Start: 2024-07-15

## 2024-08-05 RX ORDER — DROSPIRENONE AND ETHINYL ESTRADIOL 0.02-3(28)
1 KIT ORAL DAILY
Qty: 84 TABLET | Refills: 1 | OUTPATIENT
Start: 2024-08-05

## 2024-08-06 RX ORDER — DROSPIRENONE AND ETHINYL ESTRADIOL 0.02-3(28)
1 KIT ORAL DAILY
Qty: 3 PACKET | Refills: 4 | OUTPATIENT
Start: 2024-08-06

## 2024-09-04 ENCOUNTER — OFFICE VISIT (OUTPATIENT)
Facility: CLINIC | Age: 24
End: 2024-09-04
Payer: COMMERCIAL

## 2024-09-04 VITALS
DIASTOLIC BLOOD PRESSURE: 73 MMHG | TEMPERATURE: 97.8 F | HEART RATE: 73 BPM | OXYGEN SATURATION: 98 % | RESPIRATION RATE: 18 BRPM | BODY MASS INDEX: 27.91 KG/M2 | HEIGHT: 64 IN | WEIGHT: 163.5 LBS | SYSTOLIC BLOOD PRESSURE: 109 MMHG

## 2024-09-04 DIAGNOSIS — R10.33 UMBILICAL PAIN: Primary | ICD-10-CM

## 2024-09-04 DIAGNOSIS — L30.9 DERMATITIS: ICD-10-CM

## 2024-09-04 PROBLEM — S83.519A RUPTURE OF ANTERIOR CRUCIATE LIGAMENT: Status: RESOLVED | Noted: 2019-03-25 | Resolved: 2024-09-04

## 2024-09-04 PROCEDURE — 99213 OFFICE O/P EST LOW 20 MIN: CPT | Performed by: FAMILY MEDICINE

## 2024-09-04 RX ORDER — KETOCONAZOLE 20 MG/G
CREAM TOPICAL
Qty: 30 G | Refills: 1 | Status: SHIPPED | OUTPATIENT
Start: 2024-09-04

## 2024-09-04 NOTE — PROGRESS NOTES
Chief Complaint   Patient presents with    Other     Pt states that she has been having issues with her belly button before states that it normally happens when she works out but since being back in school she has not been working out states last month it was discomfort but no pain states it feels like something is being pulled. Also states that the rash on her neck is still there states that it has been itchy and it comes and goes.       \"Have you been to the ER, urgent care clinic since your last visit?  Hospitalized since your last visit?\"    NO    “Have you seen or consulted any other health care providers outside of [facility] since your last visit?”    NO            Click Here for Release of Records Request  
summary and questions were answered concerning future plans.     Please note that this dictation was completed with Cloud[employer], the computer voice recognition software.  Quite often unanticipated grammatical, syntax, homophones, and other interpretive errors are inadvertently transcribed by the computer software.  Please disregard these errors.  Please excuse any errors that have escaped final proofreading.  Thank you.

## 2024-09-25 RX ORDER — DROSPIRENONE AND ETHINYL ESTRADIOL 0.02-3(28)
1 KIT ORAL DAILY
Qty: 84 TABLET | Refills: 1 | OUTPATIENT
Start: 2024-09-25

## 2024-09-26 ENCOUNTER — HOSPITAL ENCOUNTER (OUTPATIENT)
Age: 24
Discharge: HOME | End: 2024-09-29
Payer: COMMERCIAL

## 2024-09-26 DIAGNOSIS — R10.33 UMBILICAL PAIN: ICD-10-CM

## 2024-09-26 PROCEDURE — 76705 ECHO EXAM OF ABDOMEN: CPT

## 2024-10-07 ENCOUNTER — TELEPHONE (OUTPATIENT)
Age: 24
End: 2024-10-07

## 2024-10-07 RX ORDER — DROSPIRENONE AND ETHINYL ESTRADIOL 0.02-3(28)
1 KIT ORAL DAILY
Qty: 84 TABLET | Refills: 1 | OUTPATIENT
Start: 2024-10-07

## 2024-10-07 NOTE — TELEPHONE ENCOUNTER
PT name and  verified    23 yo last ov/ae 1924, next ov/ae 27    PT calling stating she keeps trying to get refills and sending requests and they are getting denied.  RN reviewed Rx drospirenone-ethinyl estradiol (LUZ MARINA) 3-0.02 MG per tablet and gave details on Rx:  drospirenone-ethinyl estradiol (LUZ MARINA) 3-0.02 MG per tablet 3 packet 4 2024 --    Sig - Route: Take 1 tablet by mouth daily - Oral    Sent to pharmacy as: Drospirenone-Ethinyl Estradiol 3-0.02 MG Oral Tablet (LUZ MARINA)    E-Prescribing Status: Receipt confirmed by pharmacy (2024  3:01 PM EDT)        Expiration Date: 25       PT states they first gave her 3 packs and now only 1 monthly.  RN informed PT that her Rx is active and does not  until next April, and that she needs to contact her pharmacy and see how her insurance is allowing them to fill it, that we have nothing to do with that part. RN advised PT to ask them how her insurance is allowing them to fill it and if she can get a 3 month supply at a time.  PT verbalizes understanding and will call pharmacy

## 2024-10-14 RX ORDER — DROSPIRENONE AND ETHINYL ESTRADIOL 0.02-3(28)
1 KIT ORAL DAILY
Qty: 84 TABLET | Refills: 1 | OUTPATIENT
Start: 2024-10-14

## 2025-06-19 RX ORDER — DROSPIRENONE AND ETHINYL ESTRADIOL 0.02-3(28)
1 KIT ORAL DAILY
Qty: 84 TABLET | Refills: 3 | OUTPATIENT
Start: 2025-06-19